# Patient Record
Sex: MALE | Race: WHITE | NOT HISPANIC OR LATINO | URBAN - METROPOLITAN AREA
[De-identification: names, ages, dates, MRNs, and addresses within clinical notes are randomized per-mention and may not be internally consistent; named-entity substitution may affect disease eponyms.]

---

## 2025-07-23 ENCOUNTER — OFFICE VISIT (OUTPATIENT)
Facility: CLINIC | Age: 55
End: 2025-07-23
Payer: COMMERCIAL

## 2025-07-23 VITALS
BODY MASS INDEX: 28.71 KG/M2 | TEMPERATURE: 97.5 F | SYSTOLIC BLOOD PRESSURE: 133 MMHG | WEIGHT: 216.6 LBS | DIASTOLIC BLOOD PRESSURE: 86 MMHG | HEIGHT: 73 IN

## 2025-07-23 DIAGNOSIS — S00.431A TRAUMATIC HEMATOMA OF RIGHT EAR CANAL, INITIAL ENCOUNTER: Primary | ICD-10-CM

## 2025-07-23 PROCEDURE — 99203 OFFICE O/P NEW LOW 30 MIN: CPT | Performed by: OTOLARYNGOLOGY

## 2025-07-23 PROCEDURE — 3008F BODY MASS INDEX DOCD: CPT | Performed by: OTOLARYNGOLOGY

## 2025-07-23 RX ORDER — BLOOD-GLUCOSE SENSOR
EACH MISCELLANEOUS
COMMUNITY
Start: 2025-06-12

## 2025-07-23 RX ORDER — DICLOFENAC SODIUM 10 MG/G
4 GEL TOPICAL EVERY 8 HOURS PRN
COMMUNITY
Start: 2023-11-20

## 2025-07-23 RX ORDER — ROSUVASTATIN CALCIUM 10 MG/1
10 TABLET, COATED ORAL DAILY
COMMUNITY

## 2025-07-23 RX ORDER — LABETALOL 100 MG/1
100 TABLET, FILM COATED ORAL 2 TIMES DAILY
COMMUNITY

## 2025-07-23 RX ORDER — CIPROFLOXACIN AND DEXAMETHASONE 3; 1 MG/ML; MG/ML
4 SUSPENSION/ DROPS AURICULAR (OTIC) 2 TIMES DAILY
COMMUNITY
Start: 2025-07-22

## 2025-07-23 RX ORDER — ASPIRIN 81 MG/1
81 TABLET ORAL
COMMUNITY

## 2025-07-23 RX ORDER — LOVASTATIN 10 MG/1
TABLET ORAL
COMMUNITY

## 2025-07-23 NOTE — PROGRESS NOTES
"Impression:  1. Traumatic hematoma of right ear canal, initial encounter             RECOMMENDATIONS/PLAN :  In the office today I was able to place an otowick along his external canal to help compress the hematoma along his right external auditory canal.  We will continue Ciprodex drops-4 drops in the right ear twice daily for the next 7 days.  He must keep all water out of his ears.  He will avoid using a Q-tip.  He will avoid earbuds for now.      **This electronic medical record note was created with the use of voice recognition software.  Despite proofreading, typographical or grammatical errors may be present that could affect meaning of content **    Subjective   Patient ID:     Pito Mcintosh is a 54 y.o. male who presents to the office today after he recently had an earbud that was stuck along his right external canal.  This was removed by his family physician and then shortly thereafter he had severe bleeding from the ear.  He has started Ciprodex drops.  He denies scratching the ear and he is not on any significant blood thinners.  No history of any bleeding disorder.  No upper respiratory complaints fever or chills.  No problems in the left ear.  Hearing is stable.    ROS:  A detailed 12 system review of systems is noted on the intake form has been reviewed with the patient with details noted in the HPI and scanned into the patient's medical record.    Objective     Medical History[1]     Surgical History[2]     RX Allergies[3]     Current Medications[4]     Tobacco Use: High Risk (7/23/2025)    Patient History     Smoking Tobacco Use: Some Days     Smokeless Tobacco Use: Never     Passive Exposure: Not on file        Alcohol Use: Not on file        Social History     Substance and Sexual Activity   Drug Use Not on file        Physical Exam:  Visit Vitals  /86   Temp 36.4 °C (97.5 °F) (Temporal)   Ht 1.854 m (6' 1\")   Wt 98.2 kg (216 lb 9.6 oz)   BMI 28.58 kg/m²   Smoking Status Some Days   BSA " 2.25 m²      General: Patient is alert, oriented, cooperative in no apparent distress.  Head: Normocephalic, atraumatic.  Eyes: PERRL, EOMI, Conjunctiva is clear. No nystagmus.  Ears: Right Ear-- Pinna is normal.  External auditory canal is swollen with a hematoma along the external canal.  I was able to place an otowick up against the hematoma followed by 4 drops of Ciprodex.. Tympanic membrane is [intact, translucent and has good mobility with my pneumatic otoscope. No effusion].  Mastoid is nontender.  Left ear-- Pinna is normal.  External auditory canal is patent. Tympanic membrane is [intact, translucent and has good mobility with my pneumatic otoscope.  No effusion].  Mastoid is nontender.    Results:   []    Procedure:   []    Abelardo Bautista DO        [1] History reviewed. No pertinent past medical history.  [2]   Past Surgical History:  Procedure Laterality Date    BACK SURGERY  07/19/2016    Back Surgery   [3] No Known Allergies  [4]   Current Outpatient Medications:     aspirin 81 mg EC tablet, Take 1 tablet (81 mg) by mouth once daily., Disp: , Rfl:     ciprofloxacin-dexamethasone (CiproDEX) otic suspension, Administer 4 drops into affected ear(s) twice a day., Disp: , Rfl:     Dexcom G7 Sensor device, APPLY SENSOR EVERY 10 DAYS AS DIRECTED, Disp: , Rfl:     diclofenac sodium (Voltaren) 1 % gel, Apply 4.5 inches (4 g) topically every 8 hours if needed., Disp: , Rfl:     DOCOSAHEXAENOIC ACID ORAL, , Disp: , Rfl:     labetalol (Normodyne) 100 mg tablet, Take 1 tablet (100 mg) by mouth 2 times a day., Disp: , Rfl:     rosuvastatin (Crestor) 10 mg tablet, Take 1 tablet (10 mg) by mouth once daily., Disp: , Rfl:     semaglutide 0.25 mg or 0.5 mg (2 mg/3 mL) pen injector, Inject 0.5 mg under the skin once a week., Disp: , Rfl:     lovastatin (Mevacor) 10 mg tablet, Take by mouth. (Patient not taking: Reported on 7/23/2025), Disp: , Rfl:

## 2025-07-30 ENCOUNTER — APPOINTMENT (OUTPATIENT)
Facility: CLINIC | Age: 55
End: 2025-07-30
Payer: COMMERCIAL

## 2025-07-30 DIAGNOSIS — S00.431S: Primary | ICD-10-CM

## 2025-07-30 PROCEDURE — 99213 OFFICE O/P EST LOW 20 MIN: CPT | Performed by: OTOLARYNGOLOGY

## 2025-07-30 NOTE — PROGRESS NOTES
Impression:              1. Traumatic hematoma of ear canal, right, sequela             Recommendations/Plan:  I was able to remove the otowick from his right ear and his original hematoma has come down significantly..  He will avoid water in the right ear for the next 2 weeks and he can stop using the Ciprodex drops for now.    **This electronic medical record note was created with the use of voice recognition software.  Despite proofreading, typographical or grammatical errors may be present that could affect meaning of content **    Subjective:  Pito returns to the office today as a recheck on that right ear.  His otowick is in place.  He is feeling less pressure in the right ear.  No active bleeding.  He has been keeping all water out of that right ear.    Objective:    There were no vitals taken for this visit.     Current Outpatient Medications   Medication Instructions    aspirin 81 mg, Daily RT    ciprofloxacin-dexamethasone (CiproDEX) otic suspension 4 drops, 2 times daily    Dexcom G7 Sensor device APPLY SENSOR EVERY 10 DAYS AS DIRECTED    diclofenac sodium (VOLTAREN) 4 g, Every 8 hours PRN    DOCOSAHEXAENOIC ACID ORAL     labetalol (NORMODYNE) 100 mg, 2 times daily    lovastatin (Mevacor) 10 mg tablet Take by mouth.    rosuvastatin (CRESTOR) 10 mg, Daily    semaglutide 0.5 mg, Weekly        RX Allergies[1]     Physical Exam:  Right ear-external canal reveals the otowick.  This was easily removed.  His previous hematoma has improved significantly.  He still has slight residual hematoma present however his tympanic membrane is intact.  No effusion.  No further inflammation along the external canal.  No active bleeding.    Results:  []    Procedure:  []    Abelardo Bautista DO        [1] No Known Allergies